# Patient Record
Sex: MALE | Race: WHITE | NOT HISPANIC OR LATINO | Employment: UNEMPLOYED | URBAN - METROPOLITAN AREA
[De-identification: names, ages, dates, MRNs, and addresses within clinical notes are randomized per-mention and may not be internally consistent; named-entity substitution may affect disease eponyms.]

---

## 2017-05-27 ENCOUNTER — HOSPITAL ENCOUNTER (OUTPATIENT)
Facility: CLINIC | Age: 27
Setting detail: OBSERVATION
Discharge: LEFT BEFORE DISCHARGE | End: 2017-05-28
Attending: EMERGENCY MEDICINE | Admitting: INTERNAL MEDICINE
Payer: MEDICAID

## 2017-05-27 ENCOUNTER — APPOINTMENT (OUTPATIENT)
Dept: GENERAL RADIOLOGY | Facility: CLINIC | Age: 27
End: 2017-05-27
Attending: EMERGENCY MEDICINE
Payer: MEDICAID

## 2017-05-27 DIAGNOSIS — T40.1X1A HEROIN OVERDOSE, ACCIDENTAL OR UNINTENTIONAL, INITIAL ENCOUNTER (H): ICD-10-CM

## 2017-05-27 LAB
ANION GAP SERPL CALCULATED.3IONS-SCNC: 6 MMOL/L (ref 3–14)
BASOPHILS # BLD AUTO: 0 10E9/L (ref 0–0.2)
BASOPHILS NFR BLD AUTO: 0.8 %
BUN SERPL-MCNC: 13 MG/DL (ref 7–30)
CALCIUM SERPL-MCNC: 7.8 MG/DL (ref 8.5–10.1)
CHLORIDE SERPL-SCNC: 108 MMOL/L (ref 94–109)
CO2 SERPL-SCNC: 28 MMOL/L (ref 20–32)
CREAT SERPL-MCNC: 0.83 MG/DL (ref 0.66–1.25)
DIFFERENTIAL METHOD BLD: ABNORMAL
EOSINOPHIL # BLD AUTO: 0.3 10E9/L (ref 0–0.7)
EOSINOPHIL NFR BLD AUTO: 5.7 %
ERYTHROCYTE [DISTWIDTH] IN BLOOD BY AUTOMATED COUNT: 14 % (ref 10–15)
ETHANOL SERPL-MCNC: <0.01 G/DL
GFR SERPL CREATININE-BSD FRML MDRD: ABNORMAL ML/MIN/1.7M2
GLUCOSE SERPL-MCNC: 90 MG/DL (ref 70–99)
HCT VFR BLD AUTO: 35.3 % (ref 40–53)
HGB BLD-MCNC: 11.1 G/DL (ref 13.3–17.7)
IMM GRANULOCYTES # BLD: 0 10E9/L (ref 0–0.4)
IMM GRANULOCYTES NFR BLD: 0.2 %
LYMPHOCYTES # BLD AUTO: 1.8 10E9/L (ref 0.8–5.3)
LYMPHOCYTES NFR BLD AUTO: 38.7 %
MCH RBC QN AUTO: 27.5 PG (ref 26.5–33)
MCHC RBC AUTO-ENTMCNC: 31.4 G/DL (ref 31.5–36.5)
MCV RBC AUTO: 87 FL (ref 78–100)
MONOCYTES # BLD AUTO: 0.3 10E9/L (ref 0–1.3)
MONOCYTES NFR BLD AUTO: 6.1 %
NEUTROPHILS # BLD AUTO: 2.3 10E9/L (ref 1.6–8.3)
NEUTROPHILS NFR BLD AUTO: 48.5 %
NRBC # BLD AUTO: 0 10*3/UL
NRBC BLD AUTO-RTO: 0 /100
PLATELET # BLD AUTO: 219 10E9/L (ref 150–450)
POTASSIUM SERPL-SCNC: 3.9 MMOL/L (ref 3.4–5.3)
RBC # BLD AUTO: 4.04 10E12/L (ref 4.4–5.9)
SODIUM SERPL-SCNC: 141 MMOL/L (ref 133–144)
WBC # BLD AUTO: 4.8 10E9/L (ref 4–11)

## 2017-05-27 PROCEDURE — 80048 BASIC METABOLIC PNL TOTAL CA: CPT | Performed by: EMERGENCY MEDICINE

## 2017-05-27 PROCEDURE — 85025 COMPLETE CBC W/AUTO DIFF WBC: CPT | Performed by: EMERGENCY MEDICINE

## 2017-05-27 PROCEDURE — 99220 ZZC INITIAL OBSERVATION CARE,LEVL III: CPT | Mod: AI | Performed by: INTERNAL MEDICINE

## 2017-05-27 PROCEDURE — 99291 CRITICAL CARE FIRST HOUR: CPT | Mod: Z6 | Performed by: EMERGENCY MEDICINE

## 2017-05-27 PROCEDURE — 99285 EMERGENCY DEPT VISIT HI MDM: CPT | Mod: 25 | Performed by: EMERGENCY MEDICINE

## 2017-05-27 PROCEDURE — 80320 DRUG SCREEN QUANTALCOHOLS: CPT | Performed by: EMERGENCY MEDICINE

## 2017-05-27 PROCEDURE — 71010 XR CHEST PORT 1 VW: CPT

## 2017-05-27 RX ORDER — NALOXONE HYDROCHLORIDE 0.4 MG/ML
0.4 INJECTION, SOLUTION INTRAMUSCULAR; INTRAVENOUS; SUBCUTANEOUS ONCE
Status: COMPLETED | OUTPATIENT
Start: 2017-05-27 | End: 2017-05-28

## 2017-05-27 ASSESSMENT — ENCOUNTER SYMPTOMS: SHORTNESS OF BREATH: 0

## 2017-05-28 VITALS
HEIGHT: 73 IN | DIASTOLIC BLOOD PRESSURE: 81 MMHG | SYSTOLIC BLOOD PRESSURE: 118 MMHG | HEART RATE: 64 BPM | RESPIRATION RATE: 16 BRPM | TEMPERATURE: 97.8 F | WEIGHT: 155.7 LBS | BODY MASS INDEX: 20.63 KG/M2 | OXYGEN SATURATION: 95 %

## 2017-05-28 PROBLEM — T40.1X1A HEROIN OVERDOSE (H): Status: ACTIVE | Noted: 2017-05-28

## 2017-05-28 LAB
AMPHETAMINES UR QL SCN: ABNORMAL
BARBITURATES UR QL: ABNORMAL
BENZODIAZ UR QL: ABNORMAL
CANNABINOIDS UR QL SCN: ABNORMAL
CO2 BLDCOV-SCNC: 29 MMOL/L (ref 21–28)
COCAINE UR QL: ABNORMAL
ETHANOL UR QL SCN: ABNORMAL
LACTATE BLD-SCNC: 0.4 MMOL/L (ref 0.7–2.1)
OPIATES UR QL SCN: ABNORMAL
PCO2 BLDV: 50 MM HG (ref 40–50)
PH BLDV: 7.36 PH (ref 7.32–7.43)
PO2 BLDV: 63 MM HG (ref 25–47)
SAO2 % BLDV FROM PO2: 90 %

## 2017-05-28 PROCEDURE — 83605 ASSAY OF LACTIC ACID: CPT

## 2017-05-28 PROCEDURE — 80320 DRUG SCREEN QUANTALCOHOLS: CPT | Mod: 59 | Performed by: EMERGENCY MEDICINE

## 2017-05-28 PROCEDURE — 80307 DRUG TEST PRSMV CHEM ANLYZR: CPT | Mod: 59 | Performed by: EMERGENCY MEDICINE

## 2017-05-28 PROCEDURE — 80307 DRUG TEST PRSMV CHEM ANLYZR: CPT | Performed by: EMERGENCY MEDICINE

## 2017-05-28 PROCEDURE — 82803 BLOOD GASES ANY COMBINATION: CPT

## 2017-05-28 PROCEDURE — 25000128 H RX IP 250 OP 636: Performed by: EMERGENCY MEDICINE

## 2017-05-28 PROCEDURE — G0378 HOSPITAL OBSERVATION PER HR: HCPCS

## 2017-05-28 PROCEDURE — 96374 THER/PROPH/DIAG INJ IV PUSH: CPT | Performed by: EMERGENCY MEDICINE

## 2017-05-28 RX ORDER — ONDANSETRON 4 MG/1
4 TABLET, ORALLY DISINTEGRATING ORAL EVERY 6 HOURS PRN
Status: DISCONTINUED | OUTPATIENT
Start: 2017-05-28 | End: 2017-05-28 | Stop reason: HOSPADM

## 2017-05-28 RX ORDER — NALOXONE HYDROCHLORIDE 0.4 MG/ML
.1-.4 INJECTION, SOLUTION INTRAMUSCULAR; INTRAVENOUS; SUBCUTANEOUS
Status: DISCONTINUED | OUTPATIENT
Start: 2017-05-28 | End: 2017-05-28 | Stop reason: HOSPADM

## 2017-05-28 RX ORDER — ONDANSETRON 2 MG/ML
4 INJECTION INTRAMUSCULAR; INTRAVENOUS EVERY 6 HOURS PRN
Status: DISCONTINUED | OUTPATIENT
Start: 2017-05-28 | End: 2017-05-28 | Stop reason: HOSPADM

## 2017-05-28 RX ORDER — LIDOCAINE 40 MG/G
CREAM TOPICAL
Status: DISCONTINUED | OUTPATIENT
Start: 2017-05-28 | End: 2017-05-28 | Stop reason: HOSPADM

## 2017-05-28 RX ADMIN — NALOXONE HYDROCHLORIDE 0.4 MG: 0.4 INJECTION, SOLUTION INTRAMUSCULAR; INTRAVENOUS; SUBCUTANEOUS at 00:15

## 2017-05-28 NOTE — PLAN OF CARE
Problem: Discharge Planning  Goal: Discharge Planning (Adult, OB, Behavioral, Peds)  Outcome: Improving  Progress Towards Observation Goals:     1) Monitor vitals for stability: VSS, afebrile.    2) Require no narcan and able to maintain mental status: Neuros intact. No changes noted in mentation.  3) Maintain saturations on room air: O2 sats >95% on RA.  4) Monitor for signs of opioid withdrawal: Pt resting comfortably in bed at this time.

## 2017-05-28 NOTE — ED PROVIDER NOTES
"  History     Chief Complaint   Patient presents with     Drug Overdose     heroin     HPI  Jaret Garber is a 27 year old male who apparently was found unresponsive by paramedics and was given 0.5 mg of Narcan IV and woke up. The patient did not need CPR and admits that he was doing heroin. Patient states he normally uses 0.5 g of heroin a day and states he recently got out of a treatment facility for his drug use. He denies any other recreational drug use. He denies any suicidal ideation and states he has a daughter he wants to live for. Patient states that similar episodes have happened in the past and states usually it is because Fentanyl is mixed with the heroin that he buys. Patient states he did a normal dose for heroin tonight, but it overwhelmed him and was probably mixed with Fentanyl he suspects. Patient denies any shortness of breath currently and was brought here to the ER by 911.       This part of the document was transcribed by Kristina Magaña, Medical Scribe.   I have reviewed the Medications, Allergies, Past Medical and Surgical History, and Social History in the [employer] system.  Past Medical History:   Diagnosis Date     NO ACTIVE PROBLEMS        History reviewed. No pertinent surgical history.    No family history on file.    Social History   Substance Use Topics     Smoking status: Current Every Day Smoker     Packs/day: 1.00     Years: 4.00     Types: Cigarettes     Smokeless tobacco: Not on file     Alcohol use Yes      Comment: rarely     Previous Medications    CLONIDINE (CATAPRES-TTS1) 0.1 MG/24HR PATCH    Place 1 patch onto the skin once a week.    NO ACTIVE MEDICATIONS          No Known Allergies    Review of Systems   Respiratory: Negative for shortness of breath.    Psychiatric/Behavioral: Negative for suicidal ideas.   All other systems reviewed and are negative.      Physical Exam   BP: (!) 133/97  Pulse: 64  Temp: 97.8  F (36.6  C)  Resp: 18  Height: 185.4 cm (6' 1\")  Weight: 77.1 kg " (170 lb)  SpO2: 99 %  Physical Exam   Constitutional: He is oriented to person, place, and time.   Patient was found in the cubicle with slurred words and very lethargic but initially arousable to verbal stimulus after the paramedics had given Narcan in the field   HENT:   Head: Atraumatic.   Eyes: EOM are normal. Pupils are equal, round, and reactive to light.   Neck: Neck supple.   Cardiovascular: Normal heart sounds.    Pulmonary/Chest: Breath sounds normal. He has no wheezes. He has no rales.   Abdominal: Soft. There is no tenderness.   Musculoskeletal: He exhibits no edema or tenderness.   Neurological: He is oriented to person, place, and time. No cranial nerve deficit.   Arousable to verbal stimulus but otherwise falls asleep; able to follow commands and move all extremities   Skin: Skin is warm.   Needle marks on both arms   Psychiatric:   Difficult to assess secondary to the patient's lethargy       ED Course     ED Course     Procedures        The patient had blood drawn and sent to the lab for testing.  He was placed on cardiac monitor and oximetry as well as capnography and moved to cubicle #2 for close monitoring.    Results for orders placed or performed during the hospital encounter of 05/27/17   Chest  XR, 1 view portable    Narrative    EXAM: XR CHEST PORT 1 VW  5/27/2017 10:58 PM      HISTORY: overdose    COMPARISON: None    FINDINGS: AP radiograph of the chest. Cardiac silhouette is within  normal limits. No focal airspace opacities. No pneumothorax. No  pleural effusions.         Impression    IMPRESSION: Clear chest.        CBC with platelets differential   Result Value Ref Range    WBC 4.8 4.0 - 11.0 10e9/L    RBC Count 4.04 (L) 4.4 - 5.9 10e12/L    Hemoglobin 11.1 (L) 13.3 - 17.7 g/dL    Hematocrit 35.3 (L) 40.0 - 53.0 %    MCV 87 78 - 100 fl    MCH 27.5 26.5 - 33.0 pg    MCHC 31.4 (L) 31.5 - 36.5 g/dL    RDW 14.0 10.0 - 15.0 %    Platelet Count 219 150 - 450 10e9/L    Diff Method Automated  Method     % Neutrophils 48.5 %    % Lymphocytes 38.7 %    % Monocytes 6.1 %    % Eosinophils 5.7 %    % Basophils 0.8 %    % Immature Granulocytes 0.2 %    Nucleated RBCs 0 0 /100    Absolute Neutrophil 2.3 1.6 - 8.3 10e9/L    Absolute Lymphocytes 1.8 0.8 - 5.3 10e9/L    Absolute Monocytes 0.3 0.0 - 1.3 10e9/L    Absolute Eosinophils 0.3 0.0 - 0.7 10e9/L    Absolute Basophils 0.0 0.0 - 0.2 10e9/L    Abs Immature Granulocytes 0.0 0 - 0.4 10e9/L    Absolute Nucleated RBC 0.0    Basic metabolic panel   Result Value Ref Range    Sodium 141 133 - 144 mmol/L    Potassium 3.9 3.4 - 5.3 mmol/L    Chloride 108 94 - 109 mmol/L    Carbon Dioxide 28 20 - 32 mmol/L    Anion Gap 6 3 - 14 mmol/L    Glucose 90 70 - 99 mg/dL    Urea Nitrogen 13 7 - 30 mg/dL    Creatinine 0.83 0.66 - 1.25 mg/dL    GFR Estimate >90  Non  GFR Calc   >60 mL/min/1.7m2    GFR Estimate If Black >90   GFR Calc   >60 mL/min/1.7m2    Calcium 7.8 (L) 8.5 - 10.1 mg/dL     ETOH pending    Labs Ordered and Resulted from Time of ED Arrival Up to the Time of Departure from the ED   CBC WITH PLATELETS DIFFERENTIAL - Abnormal; Notable for the following:        Result Value    RBC Count 4.04 (*)     Hemoglobin 11.1 (*)     Hematocrit 35.3 (*)     MCHC 31.4 (*)     All other components within normal limits   BASIC METABOLIC PANEL - Abnormal; Notable for the following:     Calcium 7.8 (*)     All other components within normal limits   DRUG ABUSE SCREEN 6 CHEM DEP URINE ([facility])   ALCOHOL ETHYL   PULSE OXIMETRY NURSING   CARDIAC CONTINUOUS MONITORING   PERIPHERAL IV CATHETER   SPECIAL CARE NURSING   CAPNOGRAPHY NURSING         Assessments & Plan (with Medical Decision Making)     I have reviewed the nursing notes.    About an hour after arrival the patient was found to have some stridorous respirations and an O2 sat of 87%.  This resolved with physical stimulus but because of this patient will be transferred upstairs to an intermediate  care bed where he will have Narcan at his bedside shortly need to be used again.    This case required greater than 30 minutes of critical care time independent of procedures.    I have reviewed the findings, diagnosis, and plan with the patient.    Final diagnoses:   Heroin overdose, accidental or unintentional, initial encounter     Police did indicate their desire to arrest the patient upon discharge.    Alexis Abraham MD    5/27/2017   [facility], EMERGENCY DEPARTMENT     Alexis Abraham MD  05/27/17 0524       Alexis Abraham MD  05/27/17 [id number]

## 2017-05-28 NOTE — PROGRESS NOTES
"Pt left AMA at 0715 despite this writer's attempts to allow time for MD to speak with him. Pt stated he \"needed to leave at 0700\" on arrival to unit overnight and during interview with Dr. Ontiveros. Pt agreed to care plan initially, but wanted to leave \"whether or not MD comes at 0700.\" Notified Dr. Ontiveros again at 0630 that pt intended to leave at 0700. Dr. Ontiveros stated team would do their best. At 0705 pt stated \"I'm leaving now. I need to go downstairs to be with my girlfriend.\" This writer stated that the MD team has not cleared him for discharge and that at this point we are still monitoring his overall condition. Pt stated \" I feel fine. I have to go now. I want to leave AMA.\" At 0715 pt signed AMA form, witnessed by this writer, and left the unit.  "

## 2017-05-28 NOTE — H&P
Internal Medicine History and Physical      Patient Name: Jaret Garber MRN# [id number]   Age: 27 year old YOB: 1990     Date of Admission:5/27/2017  Primary care provider: [facility]  Date of Service: 5/28/2017  Admitting Team: Victoriano Dickinson in AM.          Assessment and Plan:   Jaret Garber is a 26yo homeless male w/ PMHx significant for polysubstance abuse, including daily heroin use who presents after being found unresponsive by friend after using heroin, responded to narcan but remained somnolent in ED prompting admission to medicine for observation.     ## Heroin overdose -  Pt as daily heroin user, found unresponsive by friends, responded to narcan but requiring a few doses in ED prompting observation admission. Pt suspicious for fentanyl mixed w/ heroin given previous similar reaction to fentanyl mixed w/ heroin in past. Recently completed inpatient chem dep treatment, relapsed almost immediately.   -- Admit to medicine observation  -- PRN narcan available overnight  -- VBG w/o CO2 retention, no indication for narcan drip    ## Normocytic anemia- Hgb 11.1 (from recent baseline 13.9), MCV 87. Etiology unclear given lack of bleeding, possibly dilutional given WBC and platelets which are significantly lower than baseline.  -- Trend daily CBC     CODE: full code  Diet/IVF: regular diet  DVT ppx: mechanical  Disposition/Admission Status: observation admit, likely d/c in AM if narcan not required    Patient was seen and discussed with Dr. Villafuerte who agrees with the above assessment and plan.     Sahil Ontiveros MD  IM, PGY-3  [phone]         Chief Complaint:   AMS, overdose         HPI:   Jaret Garber is a 26yo homeless male w/ PMHx significant for polysubstance abuse, including daily heroin use who presents after being found unresponsive by friend after using heroin, responded to narcan but remained somnolent in ED prompting admission to medicine for observation.  "    Patient reports using heroin daily, usually well tolerated. Last hospitalization for OD was last fall. Reports he and his GF used tonight (him IV, her intranasal) and both experienced and accidental OD. He believes that it was mixed with fentanyl because he usually does not have this reaction. Reports recently completing chem dep treatment, inpatient, but reports relapsing very quickly afterward. Also reports being homeless, and not sleeping in the last 3 days. Otherwise no recent fevers or chills. No recent illnesses. Wishes he didn't use, \"not fun anymore, I'm spending $80 to feel normal.\" Has daughters he would like to get clean for.          Past Medical History:     Past Medical History:   Diagnosis Date     NO ACTIVE PROBLEMS           Past Surgical History:   History reviewed. No pertinent surgical history.       Social History:     Social History     Social History     Marital status: Single     Spouse name: N/A     Number of children: N/A     Years of education: N/A     Occupational History     Not on file.     Social History Main Topics     Smoking status: Current Every Day Smoker     Packs/day: 1.00     Years: 4.00     Types: Cigarettes     Smokeless tobacco: Not on file     Alcohol use Yes      Comment: rarely     Drug use: Yes     Special: Marijuana      Comment: occ marijuana, uses heroin IV     Sexual activity: Yes     Partners: Female     Other Topics Concern     Not on file     Social History Narrative          Family History:     -- Family hx reviewed and not pertinent to current presentation       Immunizations:     There is no immunization history on file for this patient.         Allergies:    No Known Allergies       Medications:     Prior to Admission Medications   Prescriptions Last Dose Informant Patient Reported? Taking?   NO ACTIVE MEDICATIONS   Yes No   cloNIDine (CATAPRES-TTS1) 0.1 MG/24HR patch Unknown at Unknown time  No No   Sig: Place 1 patch onto the skin once a week.    " "  Facility-Administered Medications: None             Review of Systems:   A complete ROS was performed and is negative other than what is stated in the HPI.         Physical Exam:   Blood pressure 120/77, pulse 64, temperature 97.8  F (36.6  C), temperature source Oral, resp. rate 12, height 1.854 m (6' 1\"), weight 77.1 kg (170 lb), SpO2 91 %.  General: sitting up at edge of bed, normally conversant, awake and alert  HEENT: head nc, at, EOMI, oral mucosa moist  Chest/Resp: breathing comfortably on RA  Heart/CV: RRR  Abdomen/GI: soft, nt, nd  : deferred  Extremities/MSK: WWP, no edema, track marks on bilatearl upper extremities  Skin: no rashes on gross exam, needle marks on arms  Neuro: CNs II-XII grossly intact, A&Ox3  Psych: affect appropriate         Data:   All labs and imaging reviewed by me in [facility].    Hgb 11    Will MD Weston  , PGY-3  [phone]  Physician Attestation   IKaushal saw this patient with the resident and agree with the resident s findings and plan of care as documented in the resident s note.      I personally reviewed vital signs, medications, labs   Kaushal Villafuerte  Date of Service (when I saw the patient): 5/27/17  "

## 2017-05-28 NOTE — PROGRESS NOTES
"Admission          5/27/2017 10:05 PM  -----------------------------------------------------------  Reason for admission: Heroin overdose  Primary team notified of pt arrival.  Admitted from: ED  Via: stretcher  Accompanied by: alone  Belongings: At bedside with pt.  Admission Profile: complete  Teaching: orientation to unit and call light- call light within reach, call don't fall, use of console, meal times, when to call for the RN, and enforced importance of safety. Given \"What is outpatient observation\" handout. Questions answered.   Access: PIV  Telemetry:Placed on pt  Ht./Wt.: complete  2 RN Skin Assessment Completed By: Pt refused  Pt status: Resting in bed.    Temp:  [97.5  F (36.4  C)-97.8  F (36.6  C)] 97.5  F (36.4  C)  Pulse:  [64] 64  Heart Rate:  [53-82] 56  Resp:  [9-18] 16  BP: (120-135)/(77-99) 135/91  SpO2:  [91 %-100 %] 100 %  "

## 2017-05-28 NOTE — ED NOTES
Pt biba with heroin overdose, found at home by a friend. As per EMS, pt was unresponsive but breathing. Narcan 0.5mg IV given x1. Pt is awake and talking on arrival . A&ox4 but states he is drowsy. Afebrile and VSS. Pt states that he usually gets this way when he uses heroin with fentanyl.

## 2017-05-28 NOTE — PLAN OF CARE
"Problem: Goal Outcome Summary  Goal: Goal Outcome Summary  /81 (BP Location: Left arm)  Pulse 64  Temp 97.8  F (36.6  C) (Oral)  Resp 16  Ht 1.854 m (6' 1\")  Wt 70.6 kg (155 lb 11.2 oz)  SpO2 95%  BMI 20.54 kg/m2     Neuro: A&Ox4. Neuros intact.  Cardiac: SR. VSS.       Respiratory: Sating >95% on RA.  GI/: Voiding without difficulty. No BM.   Diet/appetite: Tolerating regular diet. Eating well.  Activity:  Assist stand by, up to chair and in halls.  Pain: Denies.     Skin: Intact, no new deficits noted.      R: Continue with POC. Notify primary team with changes.             "

## 2017-05-30 ENCOUNTER — CARE COORDINATION (OUTPATIENT)
Dept: CARE COORDINATION | Facility: CLINIC | Age: 27
End: 2017-05-30

## 2017-05-30 NOTE — PROGRESS NOTES
Patient was seen and treated in the ED and left AMA so no post discharge follow up call will be done at this time              Internal Medicine        MD notified at 7:30  after patient left unit AMA